# Patient Record
(demographics unavailable — no encounter records)

---

## 2025-03-20 NOTE — PROCEDURE
[Transvaginal OB Sonogram] : Transvaginal OB Sonogram [CRL: ___ (mm)] : CRL - [unfilled]Umm [Current GA by Sonogram: ___ (wks)] : Current GA by Sonogram: [unfilled]Uwks [___ day(s)] : [unfilled] days [FreeTextEntry1] : Single intrauterine single intrauterine pregnancy with positive fetal cardiac activity noted.  Yolk sac and fetal pole noted.  Dating is consistent with LMP; estimated due date 11/6/2025.  No free fluid in cul-de-sac.  Both ovaries were visualized and appear normal.

## 2025-03-20 NOTE — HISTORY OF PRESENT ILLNESS
[FreeTextEntry1] : 32-year-old -0-0-2 at 7 weeks 0 days by LMP of 2025 presents for confirmation of pregnancy.  Feels well overall. Some mild intermittent nausea, but able to eat and drink and taking a prenatal vitamin.  History of  x 2 no complications during pregnancy or after delivery.  This pregnancy was spontaneous and is desired.

## 2025-03-20 NOTE — PLAN
[FreeTextEntry1] : History reviewed.  First trimester teaching done.  Pregnancy precautions reviewed as well as when to notify provider. All questions answered.  NOB visit and NT screen with MFM between 11-13.5 wks. Discussed appropriate scheduling with patient. Rx for NT screen and medication list provided.